# Patient Record
Sex: FEMALE | Race: WHITE | NOT HISPANIC OR LATINO | URBAN - METROPOLITAN AREA
[De-identification: names, ages, dates, MRNs, and addresses within clinical notes are randomized per-mention and may not be internally consistent; named-entity substitution may affect disease eponyms.]

---

## 2018-09-03 ENCOUNTER — EMERGENCY (EMERGENCY)
Facility: HOSPITAL | Age: 23
LOS: 1 days | Discharge: HOME | End: 2018-09-03
Admitting: EMERGENCY MEDICINE
Payer: COMMERCIAL

## 2018-09-03 VITALS
OXYGEN SATURATION: 100 % | WEIGHT: 175.05 LBS | SYSTOLIC BLOOD PRESSURE: 150 MMHG | DIASTOLIC BLOOD PRESSURE: 90 MMHG | HEART RATE: 61 BPM | TEMPERATURE: 99 F | RESPIRATION RATE: 18 BRPM

## 2018-09-03 PROCEDURE — 99283 EMERGENCY DEPT VISIT LOW MDM: CPT

## 2018-09-03 RX ORDER — HYDROXYZINE HCL 10 MG
1 TABLET ORAL
Qty: 16 | Refills: 0 | OUTPATIENT
Start: 2018-09-03 | End: 2018-09-06

## 2018-09-03 RX ORDER — HYDROXYZINE HCL 10 MG
50 TABLET ORAL
Qty: 16 | Refills: 0 | OUTPATIENT
Start: 2018-09-03 | End: 2018-09-06

## 2018-09-03 RX ADMIN — Medication 50 MILLIGRAM(S): at 18:59

## 2018-09-03 NOTE — ED ADULT TRIAGE NOTE - CHIEF COMPLAINT QUOTE
noticed b/l lower extremity hives this morning, now radiating up leg. pt reports possibly getting bitten approx 2 days go, noticed small bumps. just got back from flight from [place] approx 1hr PTA. denies CP, trouble breathing. pt is speaking in full sentences.

## 2018-09-03 NOTE — ED ADULT NURSE NOTE - CHIEF COMPLAINT QUOTE
noticed b/l lower extremity hives this morning, now radiating up leg. pt reports possibly getting bitten approx 2 days go, noticed small bumps. just got back from flight from [place] approx 1hr PTA. denies CP, trouble breathing. pt is speaking in full sentences.

## 2018-09-03 NOTE — ED PROVIDER NOTE - MEDICAL DECISION MAKING DETAILS
patient with allergic reaction to multiple insect bites, likely bed bug bites. no airway involvement. given first dose of prednisone in ED. rx sent for prednisone and atarax

## 2018-09-03 NOTE — ED ADULT NURSE NOTE - NSIMPLEMENTINTERV_GEN_ALL_ED
Implemented All Universal Safety Interventions:  [place] to call system. Call bell, personal items and telephone within reach. Instruct patient to call for assistance. Room bathroom lighting operational. Non-slip footwear when patient is off stretcher. Physically safe environment: no spills, clutter or unnecessary equipment. Stretcher in lowest position, wheels locked, appropriate side rails in place.

## 2018-09-03 NOTE — ED PROVIDER NOTE - OBJECTIVE STATEMENT
isabel carty with pruritic bug bites over BL ankles 3 days ago while in [place]. over the weekend patient was in [place] for a wedding and noted today while on the plane ride back itching and rashs spread up to the thighs. denies fever, chills, nightsweats. patient took one benadryl with out improvement of symptoms. patient has an  coming to her apartment to determine what kind of infestation she has

## 2018-09-03 NOTE — ED PROVIDER NOTE - PHYSICAL EXAMINATION
discrete erythematous vector bites over BL ankles with erythematous macules and plaques over BL legs and thighs

## 2018-09-07 DIAGNOSIS — Z88.0 ALLERGY STATUS TO PENICILLIN: ICD-10-CM

## 2018-09-07 DIAGNOSIS — S90.561A INSECT BITE (NONVENOMOUS), RIGHT ANKLE, INITIAL ENCOUNTER: ICD-10-CM
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
## 2018-09-07 DIAGNOSIS — S90.562A INSECT BITE (NONVENOMOUS), LEFT ANKLE, INITIAL ENCOUNTER: ICD-10-CM

## 2023-08-20 NOTE — ED ADULT TRIAGE NOTE - NS ED NURSE DIRECT TO ROOM YN
Chief complaint:   Chief Complaint   Patient presents with   • Cough     Cough lasting a week - Entered by patient       Vitals:  Visit Vitals  /87   Pulse 81   Temp 98.5 °F (36.9 °C)   Resp 18   Wt 101 kg (222 lb 9.6 oz)   LMP 08/18/2023   SpO2 97%       HISTORY OF PRESENT ILLNESS     46-year-old female , with a history of rheumatoid arthritis on Humira, presents with a cough that has been minimally productive for the past week or so.  Slight sore throat and congestion initially.  Some fatigue and malaise.  No wheezing or shortness of breath.  [place]  No history of asthma or allergic rhinitis.  Is a nonsmoker      Other significant problems:  There are no problems to display for this patient.      PAST MEDICAL, FAMILY AND SOCIAL HISTORY     Medications:  Current Outpatient Medications   Medication Sig Dispense Refill   • aDAlimumab (Humira Pen) 40 MG/0.8ML pen-injector kit      • folic acid (FOLATE) 1 MG tablet Take 2,000 mcg by mouth daily.     • leucovorin (WELLCOVORIN) 10 MG tablet TAKE 1 TABLET 8 HOURS AFTER METHOTREXATE EVERY WEEK     • methotrexate (RHEUMATREX) 2.5 MG tablet TAKE 8 TABLETS BY MOUTH ONCE WEEKLY       No current facility-administered medications for this visit.       Allergies:  ALLERGIES:  Not on File    Past Medical  History/Surgeries:  No past medical history on file.    No past surgical history on file.    Family History:  No family history on file.    Social History:  Social History     Tobacco Use   • Smoking status: Never   • Smokeless tobacco: Never   Substance Use Topics   • Alcohol use: Not on file       REVIEW OF SYSTEMS     Review of Systems   Constitutional: Negative for chills, fatigue and fever.   HENT: Positive for congestion and sore throat.         Sore throat and congestion from last week have resolved   Respiratory: Positive for cough. Negative for shortness of breath and wheezing.    Cardiovascular: Negative  for chest pain.   Gastrointestinal: Negative for diarrhea, nausea and vomiting.   Musculoskeletal: Negative for arthralgias and myalgias.        History of rheumatoid arthritis for the past 20 years, under good control with Humira       PHYSICAL EXAM     Physical Exam  Vitals and nursing note reviewed.   Constitutional:       Comments: Alert, ambulatory, appears in malaise, but no acute distress   HENT:      Head:      Comments: No sinus percussion tenderness     Right Ear: Tympanic membrane normal.      Left Ear: Tympanic membrane normal.      Nose: Nose normal. No congestion.      Mouth/Throat:      Mouth: Mucous membranes are moist.      Pharynx: Oropharynx is clear. No oropharyngeal exudate or posterior oropharyngeal erythema.      Neck: No tenderness.   Eyes:      General:         Right eye: No discharge.         Left eye: No discharge.      Conjunctiva/sclera: Conjunctivae normal.      Pupils: Pupils are equal, round, and reactive to light.   Cardiovascular:      Rate and Rhythm: Normal rate and regular rhythm.      Heart sounds: Normal heart sounds. No murmur heard.     No gallop.   Pulmonary:      Effort: Pulmonary effort is normal.      Breath sounds: Normal breath sounds. No wheezing, rhonchi or rales.   Abdominal:      General: Bowel sounds are normal.      Palpations: Abdomen is soft.      Tenderness: There is no abdominal tenderness.   Lymphadenopathy:      Cervical: No cervical adenopathy.     SINUS X-RAYS        Questionable increased opacity in the right                                          maxillary sinus    CHEST X-RAYS:      Negative    ASSESSMENT/PLAN     ASSESSMENT  1.  Probable right maxillary sinusitis    PLAN  1. Amoxicillin 875 mg b.i.d. times 10 days  2. Tessalon Perles 200 mg t.i.d. p.r.n. for cough  3. Follow-up with Dr. Martin if not improving in 1 week  4. See after visit summary for further details       Yes